# Patient Record
Sex: FEMALE | Race: BLACK OR AFRICAN AMERICAN | NOT HISPANIC OR LATINO | Employment: FULL TIME | URBAN - METROPOLITAN AREA
[De-identification: names, ages, dates, MRNs, and addresses within clinical notes are randomized per-mention and may not be internally consistent; named-entity substitution may affect disease eponyms.]

---

## 2019-07-10 PROBLEM — F41.9 ANXIETY: Status: ACTIVE | Noted: 2019-07-10

## 2019-07-10 PROBLEM — R20.2 PARESTHESIA OF SKIN: Status: ACTIVE | Noted: 2019-07-10

## 2019-10-30 PROBLEM — A53.9 SYPHILIS: Status: ACTIVE | Noted: 2019-10-30

## 2020-01-24 PROBLEM — M79.605 BILATERAL LEG PAIN: Status: ACTIVE | Noted: 2020-01-24

## 2020-01-24 PROBLEM — M79.604 BILATERAL LEG PAIN: Status: ACTIVE | Noted: 2020-01-24

## 2020-01-24 PROBLEM — M25.511 CHRONIC RIGHT SHOULDER PAIN: Status: ACTIVE | Noted: 2020-01-24

## 2020-01-24 PROBLEM — G89.29 CHRONIC RIGHT SHOULDER PAIN: Status: ACTIVE | Noted: 2020-01-24

## 2020-06-15 PROBLEM — M19.90 OSTEOARTHRITIS: Status: ACTIVE | Noted: 2020-06-15

## 2020-06-15 PROBLEM — I10 BENIGN ESSENTIAL HYPERTENSION: Status: ACTIVE | Noted: 2020-06-15

## 2020-06-18 PROBLEM — G62.9 NEUROPATHY: Status: ACTIVE | Noted: 2020-06-18

## 2020-10-15 PROBLEM — Z86.73 HISTORY OF STROKE: Status: ACTIVE | Noted: 2020-10-15

## 2022-05-11 PROBLEM — G89.29 CHRONIC FOOT PAIN, LEFT: Chronic | Status: ACTIVE | Noted: 2022-05-11

## 2022-05-11 PROBLEM — M79.672 CHRONIC FOOT PAIN, LEFT: Chronic | Status: ACTIVE | Noted: 2022-05-11

## 2022-09-20 PROBLEM — G47.00 INSOMNIA: Chronic | Status: ACTIVE | Noted: 2022-09-20

## 2022-09-20 PROBLEM — M25.551 CHRONIC RIGHT HIP PAIN: Chronic | Status: ACTIVE | Noted: 2022-09-20

## 2022-09-20 PROBLEM — G89.29 CHRONIC RIGHT HIP PAIN: Chronic | Status: ACTIVE | Noted: 2022-09-20

## 2022-09-21 ENCOUNTER — PATIENT OUTREACH (OUTPATIENT)
Dept: ADMINISTRATIVE | Facility: HOSPITAL | Age: 47
End: 2022-09-21
Payer: MEDICAID

## 2023-04-05 DIAGNOSIS — E11.9 TYPE 2 DIABETES MELLITUS WITHOUT COMPLICATION: ICD-10-CM

## 2023-04-06 ENCOUNTER — PATIENT MESSAGE (OUTPATIENT)
Dept: ADMINISTRATIVE | Facility: HOSPITAL | Age: 48
End: 2023-04-06
Payer: MEDICAID

## 2024-02-12 ENCOUNTER — PATIENT OUTREACH (OUTPATIENT)
Dept: ADMINISTRATIVE | Facility: HOSPITAL | Age: 49
End: 2024-02-12

## 2024-02-12 DIAGNOSIS — E11.9 TYPE 2 DIABETES MELLITUS WITHOUT COMPLICATION, WITHOUT LONG-TERM CURRENT USE OF INSULIN: ICD-10-CM

## 2024-02-12 DIAGNOSIS — Z12.31 BREAST CANCER SCREENING BY MAMMOGRAM: Primary | ICD-10-CM

## 2024-12-11 ENCOUNTER — TELEPHONE (OUTPATIENT)
Dept: PHARMACY | Facility: CLINIC | Age: 49
End: 2024-12-11

## 2024-12-11 NOTE — TELEPHONE ENCOUNTER
[facility]/Population Health Chart Review & Patient Outreach Details For Medication Adherence Project    Reason for Outreach Encounter: 3rd Party payor non-compliance report ([employer], [employer], C, etc)  2.  Patient Outreach Method: Reviewed Patient Chart  3.   Medication in question: metformin, atorvastatin, lisinopril    LAST FILLED: 11/8/24 for 90 day supply for all 3 rx   Diabetes Medications               metFORMIN (GLUCOPHAGE-XR) 500 MG ER 24hr tablet Take 2 tablets (1,000 mg total) by mouth 2 (two) times daily with meals.    semaglutide (OZEMPIC) 0.25 mg or 0.5 mg (2 mg/3 mL) pen injector Inject 0.5 mg into the skin every 7 days.              Hypertension Medications               amLODIPine (NORVASC) 10 MG tablet Take 1 tablet (10 mg total) by mouth once daily for blood pressure.    lisinopriL 10 MG tablet Take 1 tablet (10 mg total) by mouth once daily for blood pressure.              Hyperlipidemia Medications               atorvastatin (LIPITOR) 40 MG tablet Take 1 tablet (40 mg total) by mouth once daily for cholesterol               4.  Reviewed and or Updates Made To: Patient Chart  5. Outreach Outcomes and/or actions taken: Patient filled medication and is on track to be adherent

## 2025-01-08 ENCOUNTER — TELEPHONE (OUTPATIENT)
Dept: PHARMACY | Facility: CLINIC | Age: 50
End: 2025-01-08

## 2025-01-08 NOTE — TELEPHONE ENCOUNTER
[facility]/Population Health Chart Review & Patient Outreach Details For Medication Adherence Project    Reason for Outreach Encounter: 3rd Party payor non-compliance report ([employer], [employer], C, etc)  2.  Patient Outreach Method: Reviewed patient chart   3.   Medication in question:    Hyperlipidemia Medications               atorvastatin (LIPITOR) 40 MG tablet Take 1 tablet (40 mg total) by mouth once daily for cholesterol                    last filled  11/8/24 for 90 day supply      4.  Reviewed and or Updates Made To: Patient Chart  5. Outreach Outcomes and/or actions taken: Patient filled medication and is on track to be adherent  Additional Notes:

## 2025-04-07 ENCOUNTER — TELEPHONE (OUTPATIENT)
Dept: PHARMACY | Facility: CLINIC | Age: 50
End: 2025-04-07

## 2025-04-07 NOTE — TELEPHONE ENCOUNTER
[facility]/Population Health Chart Review & Patient Outreach Details For Medication Adherence Project    Reason for Outreach Encounter: 3rd Party payor non-compliance report ([employer], [employer], C, etc)  2.  Patient Outreach Method: Reviewed patient chart   3.   Medication in question:    Hypertension Medications              amLODIPine (NORVASC) 10 MG tablet Take 1 tablet (10 mg total) by mouth once daily for blood pressure.    lisinopriL 10 MG tablet Take 1 tablet (10 mg total) by mouth once daily for blood pressure.              LF 90 ds 3/19/25    4.  Reviewed and or Updates Made To: Patient Chart  5. Outreach Outcomes and/or actions taken: Patient filled medication and is on track to be adherent  Additional Notes:

## 2025-05-02 ENCOUNTER — TELEPHONE (OUTPATIENT)
Dept: PHARMACY | Facility: CLINIC | Age: 50
End: 2025-05-02

## 2025-05-02 NOTE — TELEPHONE ENCOUNTER
[facility]/Population Health Chart Review & Patient Outreach Details For Medication Adherence Project    Reason for Outreach Encounter: 3rd Party payor non-compliance report ([employer], [employer], C, etc)  2.  Patient Outreach Method: Reviewed patient chart   3.   Medication in question:    Diabetes Medications              metFORMIN (GLUCOPHAGE-XR) 500 MG ER 24hr tablet Take 2 tablets (1,000 mg total) by mouth 2 (two) times daily with meals.    semaglutide (OZEMPIC) 0.25 mg or 0.5 mg (2 mg/3 mL) pen injector Inject 0.5 mg into the skin every 7 days.                 Metformin  last filled  3/19/25 for 90 day supply      4.  Reviewed and or Updates Made To: Patient Chart  5. Outreach Outcomes and/or actions taken: Patient filled medication and is on track to be adherent  Additional Notes:

## 2025-07-02 ENCOUNTER — TELEPHONE (OUTPATIENT)
Dept: PHARMACY | Facility: CLINIC | Age: 50
End: 2025-07-02

## 2025-07-03 NOTE — TELEPHONE ENCOUNTER
[facility]/Population Health Chart Review & Patient Outreach Details For Medication Adherence Project     1.Reason for Outreach Encounter: 3rd Party payor non-compliance report ([employer], [employer], [facility], etc)  2.  Patient Outreach Method: Reach Proshart message  3.   Medication in question: Metformin ER 500mg  LAST FILLED: 3/19/25 for 90 day supply    Diabetes Medications              metFORMIN (GLUCOPHAGE-XR) 500 MG ER 24hr tablet Take 2 tablets (1,000 mg total) by mouth 2 (two) times daily with meals.              4.  Reviewed and or Updates Made To: Patient Chart  5. Outreach Outcomes and/or actions taken: Sent inquiry to patient: Waiting for response.

## 2025-07-15 ENCOUNTER — TELEPHONE (OUTPATIENT)
Dept: PHARMACY | Facility: CLINIC | Age: 50
End: 2025-07-15

## 2025-07-16 NOTE — TELEPHONE ENCOUNTER
[facility]/Population Health Chart Review & Patient Outreach Details For Medication Adherence Project     1.Reason for Outreach Encounter: 3rd Party payor non-compliance report ([employer], [employer], [facility], etc)  2.  Patient Outreach Method: Viroprohart message  3.   Medication in question: Atorvastatin 40mg  LAST FILLED: 3/19/25 for 90 day supply    Hyperlipidemia Medications              atorvastatin (LIPITOR) 40 MG tablet Take 1 tablet (40 mg total) by mouth once daily for cholesterol               4.  Reviewed and or Updates Made To: Patient Chart  5. Outreach Outcomes and/or actions taken: Sent inquiry to patient: Waiting for response.

## 2025-07-20 ENCOUNTER — TELEPHONE (OUTPATIENT)
Dept: PHARMACY | Facility: CLINIC | Age: 50
End: 2025-07-20

## 2025-07-20 NOTE — TELEPHONE ENCOUNTER
[facility]/Population Health Chart Review & Patient Outreach Details For Medication Adherence Project    Reason for Outreach Encounter: 3rd Party payor non-compliance report ([employer], [employer], C, etc)  2.  Patient Outreach Method: Reviewed patient chart  and oLyfet message  3.   Medication in question:    Hypertension Medications              amLODIPine (NORVASC) 10 MG tablet Take 1 tablet (10 mg total) by mouth once daily for blood pressure.    lisinopriL (PRINIVIL,ZESTRIL) 20 MG tablet Take 1 tablet (20 mg total) by mouth once daily.                 Lisinopril  last filled  3/19/25 for 90 day supply      4.  Reviewed and or Updates Made To: Patient Chart  5. Outreach Outcomes and/or actions taken: Sent inquiry to patient: Waiting for response  Additional Notes:

## 2025-08-08 ENCOUNTER — TELEPHONE (OUTPATIENT)
Dept: PHARMACY | Facility: CLINIC | Age: 50
End: 2025-08-08

## 2025-08-08 NOTE — TELEPHONE ENCOUNTER
[facility]/Population Health Chart Review & Patient Outreach Details For Medication Adherence Project    Reason for Outreach Encounter: 3rd Party payor non-compliance report ([employer], [employer], [employer], etc)  2.  Patient Outreach Method: Reviewed patient chart   3.   Medication in question:    Diabetes Medications              metFORMIN (GLUCOPHAGE-XR) 500 MG ER 24hr tablet Take 2 tablets (1,000 mg total) by mouth 2 (two) times daily with meals.              Hypertension Medications              amLODIPine (NORVASC) 10 MG tablet Take 1 tablet (10 mg total) by mouth once daily for blood pressure.    lisinopriL (PRINIVIL,ZESTRIL) 20 MG tablet Take 1 tablet (20 mg total) by mouth once daily.              Hyperlipidemia Medications              atorvastatin (LIPITOR) 40 MG tablet Take 1 tablet (40 mg total) by mouth once daily.                  Atorvastatin, lisinopril and metformin last filled  7/28/25 for 90 day supply      4.  Reviewed and or Updates Made To: Patient Chart  5. Outreach Outcomes and/or actions taken: Patient filled medication and is on track to be adherent  Additional Notes: